# Patient Record
Sex: FEMALE | Race: WHITE | HISPANIC OR LATINO | Employment: STUDENT | URBAN - METROPOLITAN AREA
[De-identification: names, ages, dates, MRNs, and addresses within clinical notes are randomized per-mention and may not be internally consistent; named-entity substitution may affect disease eponyms.]

---

## 2017-03-17 ENCOUNTER — OFFICE VISIT (OUTPATIENT)
Dept: PEDIATRICS | Facility: CLINIC | Age: 22
End: 2017-03-17
Payer: COMMERCIAL

## 2017-03-17 VITALS
DIASTOLIC BLOOD PRESSURE: 66 MMHG | BODY MASS INDEX: 16.7 KG/M2 | SYSTOLIC BLOOD PRESSURE: 106 MMHG | WEIGHT: 106.38 LBS | HEIGHT: 67 IN | HEART RATE: 98 BPM | OXYGEN SATURATION: 97 %

## 2017-03-17 DIAGNOSIS — B30.9 ACUTE VIRAL CONJUNCTIVITIS OF BOTH EYES: ICD-10-CM

## 2017-03-17 DIAGNOSIS — J06.9 UPPER RESPIRATORY TRACT INFECTION, UNSPECIFIED TYPE: Primary | ICD-10-CM

## 2017-03-17 PROCEDURE — 99213 OFFICE O/P EST LOW 20 MIN: CPT | Mod: S$GLB,,, | Performed by: PEDIATRICS

## 2017-03-17 PROCEDURE — 1160F RVW MEDS BY RX/DR IN RCRD: CPT | Mod: S$GLB,,, | Performed by: PEDIATRICS

## 2017-03-17 RX ORDER — LEVOTHYROXINE SODIUM 50 UG/1
TABLET ORAL
Refills: 1 | COMMUNITY
Start: 2017-02-27

## 2017-03-17 RX ORDER — POLYMYXIN B SULFATE AND TRIMETHOPRIM 1; 10000 MG/ML; [USP'U]/ML
1 SOLUTION OPHTHALMIC EVERY 6 HOURS
Qty: 10 ML | Refills: 1 | Status: SHIPPED | OUTPATIENT
Start: 2017-03-17 | End: 2017-11-14

## 2017-03-17 NOTE — PATIENT INSTRUCTIONS
Viral Upper Respiratory Illness (Adult)  You have a viral upper respiratory illness (URI), which is another term for the common cold. This illness is contagious during the first few days. It is spread through the air by coughing and sneezing. It may also be spread by direct contact (touching the sick person and then touching your own eyes, nose, or mouth). Frequent handwashing will decrease risk of spread. Most viral illnesses go away within 7 to 10 days with rest and simple home remedies. Sometimes the illness may last for several weeks. Antibiotics will not kill a virus, and they are generally not prescribed for this condition.    Home care  · If symptoms are severe, rest at home for the first 2 to 3 days. When you resume activity, don't let yourself get too tired.  · Avoid being exposed to cigarette smoke (yours or others).  · You may use acetaminophen or ibuprofen to control pain and fever, unless another medicine was prescribed. (Note: If you have chronic liver or kidney disease, have ever had a stomach ulcer or gastrointestinal bleeding, or are taking blood-thinning medicines, talk with your healthcare provider before using these medicines.) Aspirin should never be given to anyone under 18 years of age who is ill with a viral infection or fever. It may cause severe liver or brain damage.  · Your appetite may be poor, so a light diet is fine. Avoid dehydration by drinking 6 to 8 glasses of fluids per day (water, soft drinks, juices, tea, or soup). Extra fluids will help loosen secretions in the nose and lungs.  · Over-the-counter cold medicines will not shorten the length of time youre sick, but they may be helpful for the following symptoms: cough, sore throat, and nasal and sinus congestion. (Note: Do not use decongestants if you have high blood pressure.)  Follow-up care  Follow up with your healthcare provider, or as advised.  When to seek medical advice  Call your healthcare provider right away if any  of these occur:  · Cough with lots of colored sputum (mucus)  · Severe headache; face, neck, or ear pain  · Difficulty swallowing due to throat pain  · Fever of 100.4°F (38°C)  Call 911, or get immediate medical care  Call emergency services right away if any of these occur:  · Chest pain, shortness of breath, wheezing, or difficulty breathing  · Coughing up blood  · Inability to swallow due to throat pain  Date Last Reviewed: 9/13/2015 © 2000-2016 [employer]. [address] [place] [address]. All rights reserved. This information is not intended as a substitute for professional medical care. Always follow your healthcare professional's instructions.        Conjunctivitis, Viral    Viral conjunctivitis (sometimes called pink eye) is a common infection of the eye. It is very contagious. Touching the infected eye, then touching another person passes this infection. It can also be spread from one eye to the other in this same way. The most common symptoms include redness, discharge from the eye, swollen eyelids, and a gritty or scratchy feeling in the eye.  This condition will take about 7 to 10 days to go away. Artificial tears (available without a prescription) are often recommended to moisten and clean the eyes. Antibiotic eye drops often are not recommended because they will not kill the virus. But sometimes they may be prescribed by eye doctors. This is to prevent a second, bacterial infection.  Home care  · Apply a towel soaked in cool water to the affected eye 3 to 4 times a day (just before applying medicine to the eye).  · It is common to have mucus drainage during the night, causing the eyelids to become crusted by morning. Use a warm, wet cloth to wipe this away.  · Launder cloths used to clean the eye after one use. Do not reuse them.  · If antibiotic medicines are prescribed, take them exactly as directed. Do not stop taking them until you are told to.  · You may use acetaminophen  or ibuprofen to control pain, unless another medicine was prescribed. (Note:If you have chronic liver or kidney disease, or if you have ever had a stomach ulcer or gastrointestinal bleeding, talk with your healthcare provider before using these medicines.) Aspirin should never be used in anyone under 18 years of age who is ill with a fever. It may cause severe liver damage.  · Wash your hands before and after touching the affected eye. This helps to prevent spreading the infection to your other eye and to others.  · The infected person should avoid sharing towels, washcloths, and bedding with others. This is to prevent spreading the infection.  · This illness is contagious during the first week. Children with this illness should be kept out of day care and school until the redness clears.  Follow-up care  Follow up with your healthcare provider, or as advised.  When to seek medical advice  Call your healthcare provider right away if any of these occur:  · Worsening vision  · Increasing pain in the eye  · Increasing swelling or redness of the eyelid  · Redness spreading to the face around the eye  · Large amount of green or yellow drainage from the eye  · Severe itching in or around the eye  · Fever of 100.4°F (38°C) or higher  Date Last Reviewed: 6/15/2015  © 5502-0156 [employer]. [address] [place] [address], [place] [address]. All rights reserved. This information is not intended as a substitute for professional medical care. Always follow your healthcare professional's instructions.

## 2017-03-17 NOTE — MR AVS SNAPSHOT
[facility]  [address] [facility] [address] [facility]  [place] [address]  Phone: [phone]  Fax: [phone]                  Azucena Bowles   3/17/2017 10:15 AM   Office Visit    Description:  Female : 1995   Provider:  Lisa Sierra MD   Department:  [facility]           Reason for Visit     Nasal Congestion           Diagnoses this Visit        Comments    Upper respiratory tract infection, unspecified type    -  Primary     Acute viral conjunctivitis of both eyes                To Do List           Goals (5 Years of Data)     None       These Medications        Disp Refills Start End    polymyxin B sulf-trimethoprim (POLYTRIM) 10,000 unit- 1 mg/mL Drop 10 mL 1 3/17/2017     Place 1 drop into both eyes every 6 (six) hours. - Both Eyes    Pharmacy: [facility] [id number]  JALEN HALL [address] #: [phone]         [facility] On Call     [facility] On Call Nurse [facility] Line -  Assistance  Registered nurses in the [facility] provide clinical advisement, health education, appointment booking, and other advisory services.  Call for this free service at [phone].             Medications           Message regarding Medications     Verify the changes and/or additions to your medication regime listed below are the same as discussed with your clinician today.  If any of these changes or additions are incorrect, please notify your healthcare provider.        START taking these NEW medications        Refills    polymyxin B sulf-trimethoprim (POLYTRIM) 10,000 unit- 1 mg/mL Drop 1    Sig: Place 1 drop into both eyes every 6 (six) hours.    Class: Normal    Route: Both Eyes      STOP taking these medications     fexofenadine-pseudoephedrine (ALLEGRA-D 24) 180-240 mg per 24 hr tablet Take 1 tablet by mouth once daily.    PAZEO 0.7 % Drop            Verify that the below list of medications is an accurate representation of the medications you are  "currently taking.  If none reported, the list may be blank. If incorrect, please contact your healthcare provider. Carry this list with you in case of emergency.           Current Medications     cetirizine (ZYRTEC) 5 MG tablet Take 5 mg by mouth once daily.    fluticasone (FLONASE) 50 mcg/actuation nasal spray 1 spray by Each Nare route once daily.    levothyroxine (SYNTHROID) 50 MCG tablet TK 1 T PO MONDAY THRU FRIDAY. SKIP SATURDAYS AND SUNDAYS    polymyxin B sulf-trimethoprim (POLYTRIM) 10,000 unit- 1 mg/mL Drop Place 1 drop into both eyes every 6 (six) hours.           Clinical Reference Information           Your Vitals Were     BP Pulse Height Weight Last Period SpO2    106/66 (BP Location: Left arm, Patient Position: Sitting, BP Method: Automatic) 98 5' 6.5" (1.689 m) 48.2 kg (106 lb 6 oz) 03/14/2017 (Exact Date) 97%    BMI                16.91 kg/m2          Blood Pressure          Most Recent Value    BP  106/66      Allergies as of 3/17/2017     No Known Allergies      Immunizations Administered on Date of Encounter - 3/17/2017     None      Instructions      Viral Upper Respiratory Illness (Adult)  You have a viral upper respiratory illness (URI), which is another term for the common cold. This illness is contagious during the first few days. It is spread through the air by coughing and sneezing. It may also be spread by direct contact (touching the sick person and then touching your own eyes, nose, or mouth). Frequent handwashing will decrease risk of spread. Most viral illnesses go away within 7 to 10 days with rest and simple home remedies. Sometimes the illness may last for several weeks. Antibiotics will not kill a virus, and they are generally not prescribed for this condition.    Home care  · If symptoms are severe, rest at home for the first 2 to 3 days. When you resume activity, don't let yourself get too tired.  · Avoid being exposed to cigarette smoke (yours or others).  · You may use " acetaminophen or ibuprofen to control pain and fever, unless another medicine was prescribed. (Note: If you have chronic liver or kidney disease, have ever had a stomach ulcer or gastrointestinal bleeding, or are taking blood-thinning medicines, talk with your healthcare provider before using these medicines.) Aspirin should never be given to anyone under 18 years of age who is ill with a viral infection or fever. It may cause severe liver or brain damage.  · Your appetite may be poor, so a light diet is fine. Avoid dehydration by drinking 6 to 8 glasses of fluids per day (water, soft drinks, juices, tea, or soup). Extra fluids will help loosen secretions in the nose and lungs.  · Over-the-counter cold medicines will not shorten the length of time youre sick, but they may be helpful for the following symptoms: cough, sore throat, and nasal and sinus congestion. (Note: Do not use decongestants if you have high blood pressure.)  Follow-up care  Follow up with your healthcare provider, or as advised.  When to seek medical advice  Call your healthcare provider right away if any of these occur:  · Cough with lots of colored sputum (mucus)  · Severe headache; face, neck, or ear pain  · Difficulty swallowing due to throat pain  · Fever of 100.4°F (38°C)  Call 911, or get immediate medical care  Call emergency services right away if any of these occur:  · Chest pain, shortness of breath, wheezing, or difficulty breathing  · Coughing up blood  · Inability to swallow due to throat pain  Date Last Reviewed: 9/13/2015  © 0042-4264 [employer]. [address] [place] [address], [place] [address]. All rights reserved. This information is not intended as a substitute for professional medical care. Always follow your healthcare professional's instructions.        Conjunctivitis, Viral    Viral conjunctivitis (sometimes called pink eye) is a common infection of the eye. It is very contagious. Touching the infected eye, then  touching another person passes this infection. It can also be spread from one eye to the other in this same way. The most common symptoms include redness, discharge from the eye, swollen eyelids, and a gritty or scratchy feeling in the eye.  This condition will take about 7 to 10 days to go away. Artificial tears (available without a prescription) are often recommended to moisten and clean the eyes. Antibiotic eye drops often are not recommended because they will not kill the virus. But sometimes they may be prescribed by eye doctors. This is to prevent a second, bacterial infection.  Home care  · Apply a towel soaked in cool water to the affected eye 3 to 4 times a day (just before applying medicine to the eye).  · It is common to have mucus drainage during the night, causing the eyelids to become crusted by morning. Use a warm, wet cloth to wipe this away.  · Launder cloths used to clean the eye after one use. Do not reuse them.  · If antibiotic medicines are prescribed, take them exactly as directed. Do not stop taking them until you are told to.  · You may use acetaminophen or ibuprofen to control pain, unless another medicine was prescribed. (Note:If you have chronic liver or kidney disease, or if you have ever had a stomach ulcer or gastrointestinal bleeding, talk with your healthcare provider before using these medicines.) Aspirin should never be used in anyone under 18 years of age who is ill with a fever. It may cause severe liver damage.  · Wash your hands before and after touching the affected eye. This helps to prevent spreading the infection to your other eye and to others.  · The infected person should avoid sharing towels, washcloths, and bedding with others. This is to prevent spreading the infection.  · This illness is contagious during the first week. Children with this illness should be kept out of day care and school until the redness clears.  Follow-up care  Follow up with your healthcare  provider, or as advised.  When to seek medical advice  Call your healthcare provider right away if any of these occur:  · Worsening vision  · Increasing pain in the eye  · Increasing swelling or redness of the eyelid  · Redness spreading to the face around the eye  · Large amount of green or yellow drainage from the eye  · Severe itching in or around the eye  · Fever of 100.4°F (38°C) or higher  Date Last Reviewed: 6/15/2015  © 6341-3119 [email or web address]. [address] [place] [address]. All rights reserved. This information is not intended as a substitute for professional medical care. Always follow your healthcare professional's instructions.             Language Assistance Services     ATTENTION: Language assistance services are available, free of charge. Please call [phone].      ATENCIÓN: Si francisco chávez, tiene a vallecillo disposición servicios gratuitos de asistencia lingüística. Llame al [phone].     [facility] Ý: N?u b?n nói Ti?ng Vi?t, có các d?ch v? h? tr? ngôn ng? mi?n phí dành cho b?n. G?i s? [phone].         [facility] complies with applicable Federal civil rights laws and does not discriminate on the basis of race, color, national origin, age, disability, or sex.

## 2017-03-17 NOTE — PROGRESS NOTES
"  Subjective:     History was provided by the patient and mother.  Azucena Bowles is a 22 y.o. female here for evaluation of congestion, post nasal drip, coryza, eyes watering and sinus pressure, non productive cough. Symptoms began 3 days ago. Associated symptoms include:congestion, cough and headache. Patient denies: bilateral ear pain, throat pain, nausea, vomiting, diarrha. Patient has a history of rhinitis, hypothyroidism. Current treatments have included acetaminophen, with some improvement.   Patient has had good liquid intake, with adequate urine output.    Sick contacts? Yes  Other recent illnesses? No    Past Medical History:  I have reviewed patient's past medical history and it is pertinent for:  Hypothyroidism (on synthroid)    Review of Systems   Constitutional: Negative for chills and fever.   HENT: Positive for congestion and sore throat. Negative for ear discharge and ear pain.    Eyes: Positive for discharge and redness. Negative for pain.   Respiratory: Positive for cough. Negative for sputum production, shortness of breath and wheezing.    Gastrointestinal: Negative for abdominal pain, constipation, diarrhea, nausea and vomiting.   Genitourinary: Negative for dysuria.   Musculoskeletal: Negative for myalgias.   Neurological: Positive for headaches.      Objective:    /66 (BP Location: Left arm, Patient Position: Sitting, BP Method: Automatic)  Pulse 98  Ht 5' 6.5" (1.689 m)  Wt 48.2 kg (106 lb 6 oz)  LMP 03/14/2017 (Exact Date)  SpO2 97%  BMI 16.91 kg/m2  Physical Exam   Constitutional: She appears well-developed and well-nourished. No distress.   HENT:   Head: Normocephalic.   Right Ear: Tympanic membrane and external ear normal.   Left Ear: Tympanic membrane and external ear normal.   Nose: Mucosal edema and rhinorrhea present. No nasal deformity. Right sinus exhibits no maxillary sinus tenderness and no frontal sinus tenderness. Left sinus exhibits no maxillary sinus tenderness and no " frontal sinus tenderness.   Mouth/Throat: Oropharynx is clear and moist. No oropharyngeal exudate.   Eyes: Conjunctivae are normal.   Neck: Neck supple.   Cardiovascular: Normal rate, regular rhythm and normal heart sounds.  Exam reveals no gallop and no friction rub.    No murmur heard.  Pulmonary/Chest: Effort normal and breath sounds normal. No respiratory distress. She has no wheezes. She has no rales.   Neurological: She is alert.   Skin: Skin is warm.   Nursing note and vitals reviewed.    Assessment:     Upper respiratory tract infection, unspecified type    Acute viral conjunctivitis of both eyes  -     polymyxin B sulf-trimethoprim (POLYTRIM) 10,000 unit- 1 mg/mL Drop; Place 1 drop into both eyes every 6 (six) hours.  Dispense: 10 mL; Refill: 1      Plan:   1.  Supportive care including nasal saline and/or suctioning, encouraging PO fluid intake with pedialyte, and use of anti-pyretics discussed with family.  Also discussed reasons to return to clinic or ER including high fevers, decreased alertness, signs of respiratory distress, or inability to tolerate PO fluids.

## 2017-11-14 ENCOUNTER — OFFICE VISIT (OUTPATIENT)
Dept: PEDIATRICS | Facility: CLINIC | Age: 22
End: 2017-11-14
Payer: COMMERCIAL

## 2017-11-14 VITALS
DIASTOLIC BLOOD PRESSURE: 74 MMHG | SYSTOLIC BLOOD PRESSURE: 110 MMHG | OXYGEN SATURATION: 100 % | HEIGHT: 66 IN | WEIGHT: 105.25 LBS | HEART RATE: 65 BPM | TEMPERATURE: 98 F | BODY MASS INDEX: 16.91 KG/M2

## 2017-11-14 DIAGNOSIS — I51.9 HEART PROBLEM: Primary | ICD-10-CM

## 2017-11-14 PROCEDURE — 99213 OFFICE O/P EST LOW 20 MIN: CPT | Mod: S$GLB,,, | Performed by: PEDIATRICS

## 2017-11-14 RX ORDER — ERGOCALCIFEROL 1.25 MG/1
CAPSULE ORAL
COMMUNITY
Start: 2017-11-08 | End: 2018-08-21

## 2017-11-14 NOTE — PROGRESS NOTES
Subjective:      Azucena Bowles is a 22 y.o. female here with patient and mother. Patient brought in for heart issues, flutter, fast rate x 2-3 yr (brought by mom - Anabella)      History of Present Illness:  HPI  Pt here for abnormal heart rate with exercise  Notes about once every 2 months  No family hx of heart problems  Has dx of hashimoto's but thyroid levels normal and on synthroid. Dose has been stable  Endocrine states heart issues should not be related to hashimoto's or thyroid  Has been limiting  caffeine  Only takes vitamin d. No other supplements  No light headedness or numbness or tingling when this occurs  Review of Systems  Review of systems otherwise normal except mentioned as above  See problem list    Objective:     Physical Exam  nad  Tm's clear bilaterally  Pharynx clear  heart rrr,  Pulse 7-80 on exam and normal rhythm  No murmur heard  No gallop heard  No rub noted  Lungs cta bilaterally   no increased work of breathing noted  No wheezes heard  No rales heard  No ronchi heard    Abdomen soft,   Bowel sounds present  Non tender  No masses palpated  No rashes noted  Mmm, cap refill brisk, less than 2 seconds  No obvious global/focal motor/sensory deficits  Cranial nerves 2-12 grossly intact  rom of all extremities normal for age      Assessment:        1. Heart problem         Plan:       Azucena was seen today for heart issues, flutter, fast rate x 2-3 yr.    Diagnoses and all orders for this visit:    Heart problem  -     Ambulatory referral to Pediatric Cardiology  -     Nursing communication      Await above referral  Hear trate  temp and pulse ox good in office today  rtc 24-72 prn no  Improvement 24-72 hours or sooner prn problems.  Parent/guardian voiced understanding.

## 2018-01-05 ENCOUNTER — OFFICE VISIT (OUTPATIENT)
Dept: PEDIATRICS | Facility: CLINIC | Age: 23
End: 2018-01-05
Payer: COMMERCIAL

## 2018-01-05 ENCOUNTER — TELEPHONE (OUTPATIENT)
Dept: PEDIATRICS | Facility: CLINIC | Age: 23
End: 2018-01-05

## 2018-01-05 VITALS
DIASTOLIC BLOOD PRESSURE: 67 MMHG | HEIGHT: 66 IN | SYSTOLIC BLOOD PRESSURE: 118 MMHG | TEMPERATURE: 99 F | BODY MASS INDEX: 16.24 KG/M2 | WEIGHT: 101.06 LBS

## 2018-01-05 DIAGNOSIS — J06.9 UPPER RESPIRATORY TRACT INFECTION, UNSPECIFIED TYPE: ICD-10-CM

## 2018-01-05 DIAGNOSIS — R05.9 COUGH: Primary | ICD-10-CM

## 2018-01-05 DIAGNOSIS — R09.81 NASAL CONGESTION: ICD-10-CM

## 2018-01-05 LAB
FLUAV AG SPEC QL IA: NEGATIVE
FLUBV AG SPEC QL IA: NEGATIVE
SPECIMEN SOURCE: NORMAL

## 2018-01-05 PROCEDURE — 87400 INFLUENZA A/B EACH AG IA: CPT | Mod: 59,PO

## 2018-01-05 PROCEDURE — 99213 OFFICE O/P EST LOW 20 MIN: CPT | Mod: S$GLB,,, | Performed by: PEDIATRICS

## 2018-01-05 RX ORDER — BENZONATATE 200 MG/1
200 CAPSULE ORAL 3 TIMES DAILY PRN
Qty: 30 CAPSULE | Refills: 1 | Status: SHIPPED | OUTPATIENT
Start: 2018-01-05 | End: 2018-01-15

## 2018-01-05 NOTE — TELEPHONE ENCOUNTER
----- Message from Lisa Sierra MD sent at 1/5/2018  3:16 PM CST -----  Please let family know that flu test negative, so likely a common cold virus. They may call if questions/concerns. Thank you!  -MM

## 2018-01-05 NOTE — PROGRESS NOTES
"  Subjective:     History was provided by the patient and mother.  Azucena Bowles is a 22 y.o. female here for evaluation of sore throat, congestion, non productive cough and low grade fevers. Symptoms began 3 days ago. Associated symptoms include:headache. Patient denies: vomiting, diarrhea, ear pain. Patient has a history of general health . Current treatments have included acetaminophen, with little improvement.   Patient has had good liquid intake, with adequate urine output.    Sick contacts? Yes  Other recent illnesses? No    Past Medical History:  I have reviewed patient's past medical history and it is pertinent for:    Review of Systems   Constitutional: Positive for fever and malaise/fatigue. Negative for chills.   HENT: Positive for congestion.    Respiratory: Positive for cough. Negative for wheezing.    Gastrointestinal: Negative for constipation, diarrhea, nausea and vomiting.   Genitourinary: Negative for dysuria.   Musculoskeletal: Positive for myalgias.   Skin: Negative for rash.        Objective:    /67 (BP Location: Left arm, Patient Position: Sitting, BP Method: Medium (Automatic))   Temp 98.5 °F (36.9 °C) (Oral)   Ht 5' 6" (1.676 m)   Wt 45.8 kg (101 lb 1.3 oz)   LMP 01/01/2018 (Exact Date)   BMI 16.31 kg/m²   Physical Exam   Constitutional: She appears well-developed and well-nourished. No distress.   HENT:   Head: Normocephalic.   Right Ear: External ear normal.   Left Ear: External ear normal.   Nose: Nose normal.   Mouth/Throat: Oropharynx is clear and moist. No oropharyngeal exudate.   TMs clear, clear PND, mild cobblestoning   Eyes: Conjunctivae are normal.   Neck: Neck supple.   Cardiovascular: Normal rate, regular rhythm and normal heart sounds.  Exam reveals no gallop and no friction rub.    No murmur heard.  Pulmonary/Chest: Effort normal and breath sounds normal. No respiratory distress. She has no wheezes. She has no rales.   Neurological: She is alert.   Skin: Skin is warm. "   Nursing note and vitals reviewed.         Assessment:   Cough  -     benzonatate (TESSALON) 200 MG capsule; Take 1 capsule (200 mg total) by mouth 3 (three) times daily as needed for Cough.  Dispense: 30 capsule; Refill: 1  -     Influenza antigen Nasal Swab    Upper respiratory tract infection, unspecified type    Nasal congestion      Plan:   1.  Supportive care including nasal saline and/or suctioning, encouraging PO fluid intake with pedialyte, and use of anti-pyretics discussed with family.  Also discussed reasons to return to clinic or ER including high fevers, decreased alertness, signs of respiratory distress, or inability to tolerate PO fluids.

## 2018-08-21 ENCOUNTER — TELEPHONE (OUTPATIENT)
Dept: PEDIATRICS | Facility: CLINIC | Age: 23
End: 2018-08-21

## 2018-08-21 ENCOUNTER — OFFICE VISIT (OUTPATIENT)
Dept: PEDIATRICS | Facility: CLINIC | Age: 23
End: 2018-08-21
Payer: COMMERCIAL

## 2018-08-21 VITALS
WEIGHT: 110 LBS | HEIGHT: 67 IN | OXYGEN SATURATION: 99 % | DIASTOLIC BLOOD PRESSURE: 62 MMHG | BODY MASS INDEX: 17.27 KG/M2 | TEMPERATURE: 99 F | HEART RATE: 92 BPM | SYSTOLIC BLOOD PRESSURE: 112 MMHG

## 2018-08-21 DIAGNOSIS — J03.90 TONSILLITIS: Primary | ICD-10-CM

## 2018-08-21 LAB — DEPRECATED S PYO AG THROAT QL EIA: NEGATIVE

## 2018-08-21 PROCEDURE — 3008F BODY MASS INDEX DOCD: CPT | Mod: CPTII,S$GLB,, | Performed by: PEDIATRICS

## 2018-08-21 PROCEDURE — 99213 OFFICE O/P EST LOW 20 MIN: CPT | Mod: S$GLB,,, | Performed by: PEDIATRICS

## 2018-08-21 PROCEDURE — 87081 CULTURE SCREEN ONLY: CPT

## 2018-08-21 PROCEDURE — 87880 STREP A ASSAY W/OPTIC: CPT | Mod: PO

## 2018-08-21 RX ORDER — ERGOCALCIFEROL 1.25 MG/1
50000 CAPSULE ORAL
COMMUNITY

## 2018-08-21 NOTE — PROGRESS NOTES
Subjective:      Azucena Bowles is a 23 y.o. female here with patient. Patient brought in for Nasal Congestion x 3 dys (self) and Sore Throat      History of Present Illness:  Azucena is a 24 yo female established patient presenting for evaluation of sore throat and nasal congestion x 3 days. Denies fever and cough.   Appetite is normal.  Patient is taking ibuprofen for pain and chloraseptic spray for the throat.         Review of Systems   Constitutional: Negative for activity change, appetite change and fever.   HENT: Positive for congestion, postnasal drip, rhinorrhea and sore throat.    Respiratory: Negative for cough.        Objective:     Physical Exam   Constitutional: She appears well-developed and well-nourished. No distress.   HENT:   Right Ear: External ear normal.   Left Ear: External ear normal.   Mouth/Throat: Oropharyngeal exudate present.   Nasal discharge   Eyes: Conjunctivae are normal. Right eye exhibits no discharge. Left eye exhibits no discharge.   Neck: Normal range of motion.   Cardiovascular: Normal rate and regular rhythm. Exam reveals no gallop and no friction rub.   No murmur heard.  Pulmonary/Chest: Effort normal and breath sounds normal.   Lymphadenopathy:     She has cervical adenopathy.   Neurological: She is alert. She exhibits normal muscle tone.   Skin: Skin is warm and dry.       Assessment:        1. Tonsillitis         Plan:   Azucena was seen today for nasal congestion x 3 dys and sore throat.    Diagnoses and all orders for this visit:    Tonsillitis  -     Throat Screen, Rapid    Other orders  -     Strep A culture, throat      Rapid strep was negative, f/u strep culture.  Continues supportive care for this viral infection, f/u prn.       Shanda Redding MD

## 2018-08-21 NOTE — TELEPHONE ENCOUNTER
----- Message from Shanda Redding MD sent at 8/21/2018  2:44 PM CDT -----  Please notify the patient ([phone]) that her rapid strep test was negative.  Continue supportive care.  We'll call if anything growth on the culture.

## 2018-08-23 LAB — BACTERIA THROAT CULT: NORMAL

## 2019-02-05 ENCOUNTER — OFFICE VISIT (OUTPATIENT)
Dept: PEDIATRICS | Facility: CLINIC | Age: 24
End: 2019-02-05
Payer: COMMERCIAL

## 2019-02-05 VITALS
TEMPERATURE: 98 F | BODY MASS INDEX: 16.87 KG/M2 | SYSTOLIC BLOOD PRESSURE: 108 MMHG | WEIGHT: 104.94 LBS | DIASTOLIC BLOOD PRESSURE: 70 MMHG | HEIGHT: 66 IN

## 2019-02-05 DIAGNOSIS — Z00.00 WELL ADULT EXAM: Primary | ICD-10-CM

## 2019-02-05 DIAGNOSIS — Z23 IMMUNIZATION DUE: ICD-10-CM

## 2019-02-05 PROCEDURE — 90714 TD VACC NO PRESV 7 YRS+ IM: CPT | Mod: S$GLB,,, | Performed by: PEDIATRICS

## 2019-02-05 PROCEDURE — 90686 IIV4 VACC NO PRSV 0.5 ML IM: CPT | Mod: S$GLB,,, | Performed by: PEDIATRICS

## 2019-02-05 PROCEDURE — 90686 FLU VACCINE (QUAD) GREATER THAN OR EQUAL TO 3YO PRESERVATIVE FREE IM: ICD-10-PCS | Mod: S$GLB,,, | Performed by: PEDIATRICS

## 2019-02-05 PROCEDURE — 90472 TD VACCINE GREATER THAN OR EQUAL TO 7YO PRESERVATIVE FREE IM: ICD-10-PCS | Mod: S$GLB,,, | Performed by: PEDIATRICS

## 2019-02-05 PROCEDURE — 99395 PR PREVENTIVE VISIT,EST,18-39: ICD-10-PCS | Mod: 25,S$GLB,, | Performed by: PEDIATRICS

## 2019-02-05 PROCEDURE — 90471 FLU VACCINE (QUAD) GREATER THAN OR EQUAL TO 3YO PRESERVATIVE FREE IM: ICD-10-PCS | Mod: S$GLB,,, | Performed by: PEDIATRICS

## 2019-02-05 PROCEDURE — 86580 TB INTRADERMAL TEST: CPT | Mod: S$GLB,,, | Performed by: PEDIATRICS

## 2019-02-05 PROCEDURE — 90472 IMMUNIZATION ADMIN EACH ADD: CPT | Mod: S$GLB,,, | Performed by: PEDIATRICS

## 2019-02-05 PROCEDURE — 90471 IMMUNIZATION ADMIN: CPT | Mod: S$GLB,,, | Performed by: PEDIATRICS

## 2019-02-05 PROCEDURE — 86580 POCT TB SKIN TEST: ICD-10-PCS | Mod: S$GLB,,, | Performed by: PEDIATRICS

## 2019-02-05 PROCEDURE — 90714 TD VACCINE GREATER THAN OR EQUAL TO 7YO PRESERVATIVE FREE IM: ICD-10-PCS | Mod: S$GLB,,, | Performed by: PEDIATRICS

## 2019-02-05 PROCEDURE — 99395 PREV VISIT EST AGE 18-39: CPT | Mod: 25,S$GLB,, | Performed by: PEDIATRICS

## 2019-02-05 NOTE — PROGRESS NOTES
Subjective:      Azucena Bowles is a 23 y.o. female here with patient and mother. Patient brought in for Well Child (cherelle and [employer]m-[employer].  in college.  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    Eating well.  No concerns regarding hearing  No concerns regarding  vision    Sleeping well.  No problems with urination   no problems with  bowel movements  No depression concerns  No mention of tobacco use  No mental health concerns    Review of Systems   Constitutional: Negative.  Negative for activity change, appetite change and fever.   HENT: Negative.  Negative for congestion and sore throat.    Eyes: Negative.  Negative for discharge and redness.   Respiratory: Negative.  Negative for cough and wheezing.    Cardiovascular: Negative.  Negative for chest pain and palpitations.   Gastrointestinal: Negative.  Negative for constipation, diarrhea and vomiting.   Endocrine:        See above   Genitourinary: Negative.  Negative for difficulty urinating and hematuria.   Musculoskeletal: Negative.    Skin: Negative.  Negative for rash and wound.   Allergic/Immunologic: Negative.    Neurological: Negative.  Negative for syncope and headaches.   Hematological: Negative.    Psychiatric/Behavioral: Negative.  Negative for behavioral problems and sleep disturbance.   All other systems reviewed and are negative.      Objective:     Physical Exam   Constitutional: She appears well-developed and well-nourished.   HENT:   Head: Normocephalic and atraumatic.   Right Ear: External ear normal.   Left Ear: External ear normal.   Eyes: EOM are normal. Pupils are equal, round, and reactive to light.   Neck: Normal range of motion.   Cardiovascular: Normal rate, regular rhythm and normal heart sounds.   Pulmonary/Chest: Effort normal and breath  sounds normal.   Abdominal: Soft.   Musculoskeletal: Normal range of motion.   No scoliosis   Skin: Skin is warm and dry.   Psychiatric: Her behavior is normal.   Vitals reviewed.      Assessment:        1. Well adult exam    2. Immunization due         Plan:      .Azucena was seen today for well child.    Diagnoses and all orders for this visit:    Well adult exam  -     C. trachomatis/N. gonorrhoeae by AMP DNA  -     POCT TB Skin Test Read    Immunization due  -     Influenza - Quadrivalent (3 years & older) (PF)  -     Cancel: (In Office Administered) Td Vaccine    Other orders  -     (In Office Administered) Td Vaccine - Preservative Free      Discussed age appropriate anticipatory guidance issues  Discussed immunization schedule  rtc prn  Will do second ppd if needed  Will complete further forms if needed  Keep up with endocrine

## 2019-02-06 ENCOUNTER — TELEPHONE (OUTPATIENT)
Dept: PEDIATRICS | Facility: CLINIC | Age: 24
End: 2019-02-06

## 2019-02-06 NOTE — TELEPHONE ENCOUNTER
----- Message from Jacqueline Cody sent at 2/6/2019  9:21 AM CST -----  Regarding: Lab Client Services  Contact: [phone]  Hi my name is Jacqueline I work in the Lab Client Services. We had a problem with some lab work on this patient. If someone from your office could call us at [phone] or ext [phone] that would be great. Anyone in my department can help. Thank you

## 2019-02-06 NOTE — TELEPHONE ENCOUNTER
Spoke to lab main campus ct/gc was never ordered or collected they called to say insufficient qty no such lab done

## 2019-02-07 LAB
TB INDURATION - 48 HR READ: NORMAL MM
TB INDURATION - 72 HR READ: NORMAL MM
TB SKIN TEST - 48 HR READ: NEGATIVE
TB SKIN TEST - 72 HR READ: NORMAL

## 2019-02-08 ENCOUNTER — TELEPHONE (OUTPATIENT)
Dept: PEDIATRICS | Facility: CLINIC | Age: 24
End: 2019-02-08

## 2019-02-08 NOTE — TELEPHONE ENCOUNTER
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         -diana

## 2019-02-11 NOTE — TELEPHONE ENCOUNTER
Asking for handwritten note from doctor    Ok by me. Will write note but not sure what  Needs to be in note    Will send tot riage to assist

## 2019-02-15 ENCOUNTER — TELEPHONE (OUTPATIENT)
Dept: PEDIATRICS | Facility: CLINIC | Age: 24
End: 2019-02-15

## 2019-02-15 NOTE — TELEPHONE ENCOUNTER
----- Message from Nubia June sent at 2/15/2019 11:03 AM CST -----  Contact: Anabella roach [phone]  Mom is requesting a call back from the nurse because she has questions about her child's shot record. Please call mom

## 2019-02-19 ENCOUNTER — CLINICAL SUPPORT (OUTPATIENT)
Dept: PEDIATRICS | Facility: CLINIC | Age: 24
End: 2019-02-19
Payer: COMMERCIAL

## 2019-02-19 DIAGNOSIS — Z11.1 SCREENING-PULMONARY TB: Primary | ICD-10-CM

## 2019-02-19 PROCEDURE — 86580 TB INTRADERMAL TEST: CPT | Mod: S$GLB,,, | Performed by: PEDIATRICS

## 2019-02-19 PROCEDURE — 86580 POCT TB SKIN TEST: ICD-10-PCS | Mod: S$GLB,,, | Performed by: PEDIATRICS

## 2019-02-21 LAB
TB INDURATION - 48 HR READ: 0 MM
TB INDURATION - 72 HR READ: NORMAL MM
TB SKIN TEST - 48 HR READ: NEGATIVE
TB SKIN TEST - 72 HR READ: NORMAL

## 2019-03-04 NOTE — PROGRESS NOTES
Patient seen in clinic to have PPD skin test placed. Instructed patient on when to return to clinic, no adverse reactions noted.

## 2020-03-10 ENCOUNTER — OFFICE VISIT (OUTPATIENT)
Dept: FAMILY MEDICINE | Facility: CLINIC | Age: 25
End: 2020-03-10
Payer: COMMERCIAL

## 2020-03-10 VITALS
BODY MASS INDEX: 17.01 KG/M2 | OXYGEN SATURATION: 100 % | SYSTOLIC BLOOD PRESSURE: 106 MMHG | TEMPERATURE: 99 F | WEIGHT: 105.38 LBS | DIASTOLIC BLOOD PRESSURE: 60 MMHG | HEART RATE: 84 BPM | RESPIRATION RATE: 20 BRPM

## 2020-03-10 DIAGNOSIS — E55.9 VITAMIN D DEFICIENCY: ICD-10-CM

## 2020-03-10 DIAGNOSIS — H00.011 HORDEOLUM EXTERNUM OF RIGHT UPPER EYELID: Primary | ICD-10-CM

## 2020-03-10 DIAGNOSIS — E03.9 HYPOTHYROIDISM, UNSPECIFIED TYPE: ICD-10-CM

## 2020-03-10 PROCEDURE — 99203 PR OFFICE/OUTPT VISIT, NEW, LEVL III, 30-44 MIN: ICD-10-PCS | Mod: S$GLB,,, | Performed by: FAMILY MEDICINE

## 2020-03-10 PROCEDURE — 3008F PR BODY MASS INDEX (BMI) DOCUMENTED: ICD-10-PCS | Mod: CPTII,S$GLB,, | Performed by: FAMILY MEDICINE

## 2020-03-10 PROCEDURE — 99999 PR PBB SHADOW E&M-EST. PATIENT-LVL III: ICD-10-PCS | Mod: PBBFAC,,, | Performed by: FAMILY MEDICINE

## 2020-03-10 PROCEDURE — 99999 PR PBB SHADOW E&M-EST. PATIENT-LVL III: CPT | Mod: PBBFAC,,, | Performed by: FAMILY MEDICINE

## 2020-03-10 PROCEDURE — 3008F BODY MASS INDEX DOCD: CPT | Mod: CPTII,S$GLB,, | Performed by: FAMILY MEDICINE

## 2020-03-10 PROCEDURE — 99203 OFFICE O/P NEW LOW 30 MIN: CPT | Mod: S$GLB,,, | Performed by: FAMILY MEDICINE

## 2020-03-10 RX ORDER — SULFAMETHOXAZOLE AND TRIMETHOPRIM 800; 160 MG/1; MG/1
1 TABLET ORAL 2 TIMES DAILY
Qty: 14 TABLET | Refills: 0 | Status: SHIPPED | OUTPATIENT
Start: 2020-03-10 | End: 2020-03-17

## 2020-03-10 RX ORDER — POLYMYXIN B SULFATE AND TRIMETHOPRIM 1; 10000 MG/ML; [USP'U]/ML
1 SOLUTION OPHTHALMIC EVERY 6 HOURS
Qty: 10 ML | Refills: 0 | Status: SHIPPED | OUTPATIENT
Start: 2020-03-10

## 2020-03-10 NOTE — PROGRESS NOTES
Subjective:       Patient ID: Azucena Bowles is a 25 y.o. female.    Chief Complaint: Belepharitis (possible stye on eye)    HPI   25 year old female presents with swelling of right upper eyelid. She states that it started last night. She applied a warm compress that helped some. She does not use contact lenses. She does use eye makeup. Has not applied any makeup today. States that it is tender to the touch. No visual disturbance. No fevers.   Patient also takes medication for hypothyroidism and Vitamin D deficiency.    Review of Systems   Constitutional: Negative for chills and fever.   HENT: Negative for congestion and rhinorrhea.    Eyes: Positive for pain (right upper eye lid). Negative for photophobia, discharge, redness, itching and visual disturbance.   Respiratory: Negative for shortness of breath and wheezing.    Cardiovascular: Negative for chest pain and palpitations.   Gastrointestinal: Negative for abdominal pain.   Genitourinary: Negative for dysuria and menstrual problem.   Skin: Positive for rash (redness of upper eyelid).   Allergic/Immunologic: Negative for environmental allergies, food allergies and immunocompromised state.   Neurological: Negative for headaches.   Hematological: Negative for adenopathy.       Objective:     /60 (BP Location: Left arm, Patient Position: Sitting, BP Method: Small (Automatic))   Pulse 84   Temp 99.2 °F (37.3 °C) (Oral)   Resp 20   Wt 47.8 kg (105 lb 6.1 oz)   SpO2 100%   BMI 17.01 kg/m²     Physical Exam   Constitutional: She appears well-developed and well-nourished. No distress.   HENT:   Head: Normocephalic and atraumatic.   Right Ear: External ear normal.   Left Ear: External ear normal.   Nose: Nose normal.   Mouth/Throat: Oropharynx is clear and moist.   Eyes: Pupils are equal, round, and reactive to light. Conjunctivae and EOM are normal. Right eye exhibits hordeolum. Right eye exhibits no chemosis, no discharge and no exudate. Left eye exhibits no  chemosis, no discharge, no exudate and no hordeolum. Right conjunctiva is not injected. Left conjunctiva is not injected.       Neck: Normal range of motion. Neck supple.   Cardiovascular: Normal rate, regular rhythm, normal heart sounds and intact distal pulses.   Pulmonary/Chest: Effort normal and breath sounds normal. No stridor. She has no wheezes. She has no rales.       Assessment:       1. Hordeolum externum of right upper eyelid    2. Hypothyroidism, unspecified type    3. Vitamin D deficiency        Plan:       Azucena was seen today for belepharitis.    Diagnoses and all orders for this visit:    Hordeolum externum of right upper eyelid  -     polymyxin B sulf-trimethoprim (POLYTRIM) 10,000 unit- 1 mg/mL Drop; Place 1 drop into the right eye every 6 (six) hours.  -     sulfamethoxazole-trimethoprim 800-160mg (BACTRIM DS) 800-160 mg Tab; Take 1 tablet by mouth 2 (two) times daily. for 7 days  -     Ambulatory referral/consult to Optometry; Future  Advised continued warm compresses for 10-15 minutes, 4 times a day.  Start on topical Polytrim and oral Bactrim.  Advised to stop medication if any rash, and call office.  Follow up with optometry if not resolved in a few days.    Hypothyroidism, unspecified type  On levothyroxine.    Vitamin D deficiency  On Vitamin D weekly.

## 2020-03-19 ENCOUNTER — TELEPHONE (OUTPATIENT)
Dept: FAMILY MEDICINE | Facility: CLINIC | Age: 25
End: 2020-03-19

## 2020-03-19 RX ORDER — NYSTATIN 100000 [USP'U]/ML
5 SUSPENSION ORAL 4 TIMES DAILY
Qty: 200 ML | Refills: 0 | Status: SHIPPED | OUTPATIENT
Start: 2020-03-19 | End: 2020-03-29

## 2020-03-19 NOTE — TELEPHONE ENCOUNTER
----- Message from Tracey Dalton sent at 3/19/2020  9:45 AM CDT -----  Contact: Anabella-Mother  Type: Patient Call Back    Who called:Anabella    What is the request in detail:Anabella called because patient was prescribed an antibiotic and has now gotten thrush. Please call to advise.    Can the clinic reply by [facility]?    Would the patient rather a call back or a response via [facility]? Call    Best call back number:[phone]

## 2020-03-19 NOTE — TELEPHONE ENCOUNTER
----- Message from Paz Hargrove MA sent at 3/19/2020 10:14 AM CDT -----  Contact: Anabella-Mother  Please Advise  ----- Message -----  From: Tracey Dalton  Sent: 3/19/2020   9:45 AM CDT  To: [facility] Staff    Type: Patient Call Back    Who called:Anabella    What is the request in detail:Anabella called because patient was prescribed an antibiotic and has now gotten thrush. Please call to advise.    Can the clinic reply by [facility]?    Would the patient rather a call back or a response via [facility]? Call    Best call back number:[phone]

## 2020-05-27 ENCOUNTER — TELEPHONE (OUTPATIENT)
Dept: FAMILY MEDICINE | Facility: CLINIC | Age: 25
End: 2020-05-27

## 2020-05-27 NOTE — TELEPHONE ENCOUNTER
I spoke to the pt mother and she advises when labs were drawn it immediately started to sting and burn. The burning sensation lasted for two hours. No redness or swelling noted. Pain was initially a 7/10 and pain has decreased since taking Ibuprofen to a 1/10. Please advise

## 2020-05-27 NOTE — TELEPHONE ENCOUNTER
"----- Message from Janethmarzena Macias sent at 5/27/2020 12:41 PM CDT -----  Contact: Anabella"mother" [phone]  Type: Patient Call Back    Who called:Anabella"mother"    What is the request in detail: calling in regards to patient, she went to [facility] on today to get her labs drawn for her thyroid and when she was stuck with the needle it burned and it is still burning and she is wondering what they should do    Can the clinic reply by [facility]? Call back    Would the patient rather a call back or a response via [facility]? Call back    Best call back number: [phone]          "

## 2021-01-08 ENCOUNTER — IMMUNIZATION (OUTPATIENT)
Dept: INTERNAL MEDICINE | Facility: CLINIC | Age: 26
End: 2021-01-08
Payer: COMMERCIAL

## 2021-01-08 DIAGNOSIS — Z23 NEED FOR VACCINATION: ICD-10-CM

## 2021-01-08 PROCEDURE — 0001A COVID-19, MRNA, LNP-S, PF, 30 MCG/0.3 ML DOSE VACCINE: ICD-10-PCS | Mod: CV19,,, | Performed by: INTERNAL MEDICINE

## 2021-01-08 PROCEDURE — 91300 COVID-19, MRNA, LNP-S, PF, 30 MCG/0.3 ML DOSE VACCINE: ICD-10-PCS | Mod: ,,, | Performed by: INTERNAL MEDICINE

## 2021-01-08 PROCEDURE — 91300 COVID-19, MRNA, LNP-S, PF, 30 MCG/0.3 ML DOSE VACCINE: CPT | Mod: ,,, | Performed by: INTERNAL MEDICINE

## 2021-01-08 PROCEDURE — 0001A COVID-19, MRNA, LNP-S, PF, 30 MCG/0.3 ML DOSE VACCINE: CPT | Mod: CV19,,, | Performed by: INTERNAL MEDICINE

## 2021-01-29 ENCOUNTER — IMMUNIZATION (OUTPATIENT)
Dept: INTERNAL MEDICINE | Facility: CLINIC | Age: 26
End: 2021-01-29
Payer: COMMERCIAL

## 2021-01-29 DIAGNOSIS — Z23 NEED FOR VACCINATION: Primary | ICD-10-CM

## 2021-01-29 PROCEDURE — 0002A COVID-19, MRNA, LNP-S, PF, 30 MCG/0.3 ML DOSE VACCINE: CPT | Mod: PBBFAC | Performed by: INTERNAL MEDICINE

## 2021-01-29 PROCEDURE — 91300 COVID-19, MRNA, LNP-S, PF, 30 MCG/0.3 ML DOSE VACCINE: CPT | Mod: PBBFAC | Performed by: INTERNAL MEDICINE

## 2021-07-12 ENCOUNTER — OFFICE VISIT (OUTPATIENT)
Dept: OBSTETRICS AND GYNECOLOGY | Facility: CLINIC | Age: 26
End: 2021-07-12
Payer: COMMERCIAL

## 2021-07-12 VITALS — WEIGHT: 102.94 LBS | DIASTOLIC BLOOD PRESSURE: 72 MMHG | SYSTOLIC BLOOD PRESSURE: 120 MMHG

## 2021-07-12 DIAGNOSIS — N76.0 CONTACT VAGINITIS: ICD-10-CM

## 2021-07-12 DIAGNOSIS — Z01.419 VISIT FOR GYNECOLOGIC EXAMINATION: Primary | ICD-10-CM

## 2021-07-12 PROCEDURE — 99999 PR PBB SHADOW E&M-NEW PATIENT-LVL II: ICD-10-PCS | Mod: PBBFAC,,, | Performed by: OBSTETRICS & GYNECOLOGY

## 2021-07-12 PROCEDURE — 87481 CANDIDA DNA AMP PROBE: CPT | Mod: 59 | Performed by: OBSTETRICS & GYNECOLOGY

## 2021-07-12 PROCEDURE — 99999 PR PBB SHADOW E&M-NEW PATIENT-LVL II: CPT | Mod: PBBFAC,,, | Performed by: OBSTETRICS & GYNECOLOGY

## 2021-07-12 PROCEDURE — 99385 PREV VISIT NEW AGE 18-39: CPT | Mod: S$GLB,,, | Performed by: OBSTETRICS & GYNECOLOGY

## 2021-07-12 PROCEDURE — 99385 PR PREVENTIVE VISIT,NEW,18-39: ICD-10-PCS | Mod: S$GLB,,, | Performed by: OBSTETRICS & GYNECOLOGY

## 2021-07-12 PROCEDURE — 88175 CYTOPATH C/V AUTO FLUID REDO: CPT | Performed by: OBSTETRICS & GYNECOLOGY

## 2021-07-16 LAB
BACTERIAL VAGINOSIS DNA: NEGATIVE
CANDIDA GLABRATA DNA: NEGATIVE
CANDIDA KRUSEI DNA: NEGATIVE
CANDIDA RRNA VAG QL PROBE: NEGATIVE
FINAL PATHOLOGIC DIAGNOSIS: NORMAL
Lab: NORMAL
T VAGINALIS RRNA GENITAL QL PROBE: NEGATIVE

## 2021-07-18 ENCOUNTER — PATIENT MESSAGE (OUTPATIENT)
Dept: OBSTETRICS AND GYNECOLOGY | Facility: CLINIC | Age: 26
End: 2021-07-18

## 2025-05-15 ENCOUNTER — HOSPITAL ENCOUNTER (EMERGENCY)
Facility: HOSPITAL | Age: 30
Discharge: HOME | End: 2025-05-15
Attending: EMERGENCY MEDICINE
Payer: COMMERCIAL

## 2025-05-15 VITALS
TEMPERATURE: 98 F | DIASTOLIC BLOOD PRESSURE: 70 MMHG | WEIGHT: 110 LBS | HEIGHT: 65 IN | BODY MASS INDEX: 18.33 KG/M2 | SYSTOLIC BLOOD PRESSURE: 111 MMHG | HEART RATE: 85 BPM | OXYGEN SATURATION: 100 % | RESPIRATION RATE: 16 BRPM

## 2025-05-15 DIAGNOSIS — S52.125A CLOSED NONDISPLACED FRACTURE OF HEAD OF LEFT RADIUS, INITIAL ENCOUNTER: Primary | ICD-10-CM

## 2025-05-15 DIAGNOSIS — M25.529 ELBOW PAIN: ICD-10-CM
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
## 2025-05-15 DIAGNOSIS — M25.532 LEFT WRIST PAIN: ICD-10-CM

## 2025-05-15 LAB
B-HCG UR QL: NEGATIVE
CTP QC/QA: YES

## 2025-05-15 PROCEDURE — 81025 URINE PREGNANCY TEST: CPT | Performed by: PHYSICIAN ASSISTANT

## 2025-05-15 PROCEDURE — 96372 THER/PROPH/DIAG INJ SC/IM: CPT

## 2025-05-15 PROCEDURE — 99284 EMERGENCY DEPT VISIT MOD MDM: CPT | Mod: 25

## 2025-05-15 PROCEDURE — 25000003 PHARM REV CODE 250

## 2025-05-15 PROCEDURE — 63600175 PHARM REV CODE 636 W HCPCS

## 2025-05-15 RX ORDER — DEXAMETHASONE SODIUM PHOSPHATE 4 MG/ML
8 INJECTION, SOLUTION INTRA-ARTICULAR; INTRALESIONAL; INTRAMUSCULAR; INTRAVENOUS; SOFT TISSUE
Status: COMPLETED | OUTPATIENT
Start: 2025-05-15 | End: 2025-05-15

## 2025-05-15 RX ORDER — DIPHENHYDRAMINE HCL 25 MG
25 CAPSULE ORAL
Status: COMPLETED | OUTPATIENT
Start: 2025-05-15 | End: 2025-05-15

## 2025-05-15 RX ORDER — IBUPROFEN 600 MG/1
600 TABLET, FILM COATED ORAL
Status: COMPLETED | OUTPATIENT
Start: 2025-05-15 | End: 2025-05-15

## 2025-05-15 RX ORDER — FAMOTIDINE 20 MG/1
20 TABLET, FILM COATED ORAL
Status: COMPLETED | OUTPATIENT
Start: 2025-05-15 | End: 2025-05-15

## 2025-05-15 RX ADMIN — IBUPROFEN 600 MG: 600 TABLET, FILM COATED ORAL at 05:05

## 2025-05-15 RX ADMIN — DIPHENHYDRAMINE HYDROCHLORIDE 25 MG: 25 CAPSULE ORAL at 06:05

## 2025-05-15 RX ADMIN — FAMOTIDINE 20 MG: 20 TABLET, FILM COATED ORAL at 06:05

## 2025-05-15 RX ADMIN — DEXAMETHASONE SODIUM PHOSPHATE 8 MG: 4 INJECTION INTRA-ARTICULAR; INTRALESIONAL; INTRAMUSCULAR; INTRAVENOUS; SOFT TISSUE at 06:05

## 2025-05-15 NOTE — ED PROVIDER NOTES
Encounter Date: 5/15/2025    SCRIBE #1 NOTE: I, Patricia Go, am scribing for, and in the presence of,  Felecia Castellon PA-C. I have scribed the following portions of the note - Other sections scribed: HPI,ROS.       History     Chief Complaint   Patient presents with    Fall     Was roller skating and fell backwards, tried to catch herself injuring LEFT elbow. Reports painful to move. Some swelling noted. Used tylenol without relief.      Azucena Bowles is a 30 y.o. female, with no pertinent PMHx, who presents to the ED from fall onset 2 hours ago. Patient reports symptoms of left wrist pain, left elbow pain and swelling. Patient reports that she was roller skating when she tripped on a stick and fell backwards. She reports bracing with her arms(left more than her right). Patient denies hitting her head or LOC. Patient reports that her pain is worse with attempting to turn/twist her left lower arm/wrist. Patient endorses taking tylenol(last taking 1.5 hours ago).No other exacerbating or alleviating factors.     The history is provided by the patient. No  was used.     Review of patient's allergies indicates:   Allergen Reactions    Ibuprofen Hives     History reviewed. No pertinent past medical history.  History reviewed. No pertinent surgical history.  No family history on file.  Social History[1]  Review of Systems   Constitutional:  Negative for fever.   HENT:  Negative for sore throat.    Eyes:  Negative for visual disturbance.   Respiratory:  Negative for shortness of breath.    Cardiovascular:  Negative for chest pain.   Gastrointestinal:  Negative for abdominal pain, diarrhea and vomiting.   Genitourinary:  Negative for dysuria.   Musculoskeletal:  Positive for arthralgias (Left elbow and wrist) and joint swelling.   Skin:  Negative for rash.   Neurological:  Negative for syncope, weakness and headaches.       Physical Exam     Initial Vitals [05/15/25 1649]   BP Pulse Resp Temp  SpO2   122/70 108 17 97.3 °F (36.3 °C) 100 %      MAP       --         Physical Exam    Vitals reviewed.  Constitutional: She appears well-developed and well-nourished.   HENT:   Head: Normocephalic.   No posterior oropharyngeal swelling noted.  No tongue swelling.  Patient patient maintaining oral secretions   Eyes: Conjunctivae are normal.   Neck:   Normal range of motion.  Cardiovascular:  Normal rate, regular rhythm and normal heart sounds.           Pulmonary/Chest: Breath sounds normal. No respiratory distress. She has no wheezes. She has no rhonchi. She has no rales.   Patient is speaking in full complete sentences, no acute respiratory distress.  Patient is maintaining airway   Musculoskeletal:         General: Normal range of motion.      Cervical back: Normal range of motion.      Comments: Tenderness to palpation to posterior elbow.  Mild swelling appreciated.  Patient has limited range motion of elbow due to pain.  Able to extend elbow to 110° before coming painful.  Patient also experiences pain with full flexion of elbow.  Patient endorses pain with supination and pronation of left arm.  No tenderness to palpation of left wrist or snuffbox tenderness.  Patient has good  strength with pain.  2+ radial pulses.  Finger opposition intact no obvious deformity.     Neurological: She is alert.   Skin: Skin is warm and dry. No rash noted. No erythema.   Psychiatric: She has a normal mood and affect. Her behavior is normal. Judgment and thought content normal.         ED Course   Procedures  Labs Reviewed   POCT URINE PREGNANCY       Result Value    POC Preg Test, Ur Negative       Acceptable Yes            Imaging Results              X-Ray Wrist Complete Left (Final result)  Result time 05/15/25 20:07:44      Final result by Bernice Reyes MD (05/15/25 20:07:44)                   Impression:      No acute displaced fracture seen.      Electronically signed by: Bernice Reyes  MD  Date:    05/15/2025  Time:    20:07               Narrative:    EXAMINATION:  XR WRIST COMPLETE 3 VIEWS LEFT    CLINICAL HISTORY:  Pain in left wrist    TECHNIQUE:  PA, lateral, and oblique views of the left wrist were performed.    COMPARISON:  None    FINDINGS:  No evidence of acute displaced fracture, dislocation, or osseous destructive process.  There is negative ulnar variance.  No radiopaque retained foreign body seen.                                       X-Ray Elbow Complete Left (Final result)  Result time 05/15/25 20:12:32      Final result by Bernice Reyes MD (05/15/25 20:12:32)                   Impression:      Acute nondisplaced radial head fracture with elbow joint effusion.      Electronically signed by: Bernice Reyes MD  Date:    05/15/2025  Time:    20:12               Narrative:    EXAMINATION:  XR ELBOW COMPLETE 3 VIEW LEFT    CLINICAL HISTORY:  Pain in unspecified elbow    TECHNIQUE:  AP, lateral, and oblique views of the left elbow were performed.    COMPARISON:  None    FINDINGS:  Acute nondisplaced radial head fracture is seen.  There is elbow joint effusion.  No additional acute displaced fracture or dislocation seen.                                       Medications   ibuprofen tablet 600 mg (600 mg Oral Given 5/15/25 1755)   diphenhydrAMINE capsule 25 mg (25 mg Oral Given 5/15/25 1847)   dexAMETHasone injection 8 mg (8 mg Intramuscular Given 5/15/25 1848)   famotidine tablet 20 mg (20 mg Oral Given 5/15/25 1847)     Medical Decision Making  30-year-old female presents secondary to left elbow and wrist pain status post mechanical fall.  Differential diagnosis includes but not limited to: Fracture, dislocation, sprain/strain, contusion, and others    Patient is overall well-appearing in no acute distress.  Vital signs reassuring.  Afebrile.  On physical exam patient had tenderness to posterior aspect of left elbow with mild swelling appreciated.  Patient endorses pain with  supination pronation of left arm.  Patient's range motion of elbow is limited due to pain.  No tenderness to palpation of wrist, snuffbox tenderness or fingers finger opposition intact.  2+ distal pulses.  Rest of physical as seen above.    X-ray of elbow showed acute nondisplaced radial head fracture, no fracture to the left wrist    Patient received ibuprofen for pain.  Approximately an hour after receiving medication was informed that patient was breaking out in hives all over her face.  Patient does endorse previous reaction approximately a month ago.  Patient is maintaining airway, no tongue swelling or throat tightness.  Patient in no acute respiratory distress.  No signs of anaphylaxis patient received Pepcid, Benadryl and Decadron.  Added allergy of ibuprofen to patient's chart.  Reassessment approximately 40 minutes after medication administration hives were decreasing and patient states she was feeling better.      Place patient in a sling and told complete immobilization for the next 2 days.  After 2 days to take sling off and to start with gentle range motion and stretches such as supination and pronation and then progress to flexion and extension.  Instructed to continue to ice area and take Tylenol as needed.  Instructed to follow up with Orthopedics and PCP.  We discussed strict return precautions.  Patient is understanding and agreeable with treatment plan    Amount and/or Complexity of Data Reviewed  Radiology: ordered. Decision-making details documented in ED Course.    Risk  OTC drugs.  Prescription drug management.            Scribe Attestation:   Scribe #1: I performed the above scribed service and the documentation accurately describes the services I performed. I attest to the accuracy of the note.        ED Course as of 05/15/25 2036   Thu May 15, 2025   1715 BP: 122/70 [MB]   1715 Temp: 97.3 °F (36.3 °C) [MB]   1715 Pulse: 108 [MB]   1715 Resp: 17 [MB]   1715 SpO2: 100 % [MB]   1846 Visitor  with patient's stepped out and informed me that patient has started having hives break out all over her face.  Patient reports to me that she had a similar reaction approximately a month ago when taking ibuprofen.  She reports she took a Benadryl and applied a steroid cream and it resolved in approximately an hour.  She denies any tongue swelling, throat tightening, difficulty swallowing, difficulty breathing.  On physical exam posterior oropharyngeal is clear with no signs of swelling.  Patient maintaining oral secretions.  Lung sounds clear throughout.  Will add ibuprofen to patient's allergy list.  Will give patient Benadryl, Pepcid and Decadron injection for reaction [MB]   1931 Went to reassess patient proximally 40 minutes after medication administration.  Hives seem to be decreasing in the face.  Patient is still feeling okay in no acute respiratory distress.  Denies any anaphylaxis symptoms. [MB]   2016 X-Ray Elbow Complete Left  Acute nondisplaced radial head fracture with elbow joint effusion. [MB]      ED Course User Index  [MB] Felecia Castellon PA-C          I, Felecia Castellon PA-C, personally performed the services described in this documentation. All medical record entries made by the scribe were at my direction and in my presence. I have reviewed the chart and agree that the record reflects my personal performance and is accurate and complete.                  Clinical Impression:  Final diagnoses:  [M25.529] Elbow pain  [M25.532] Left wrist pain  [W19.XXXA] Fall, initial encounter  [S52.125A] Closed nondisplaced fracture of head of left radius, initial encounter (Primary)          ED Disposition Condition    Discharge Stable          ED Prescriptions    None       Follow-up Information       Follow up With Specialties Details Why Contact Info    [facility] Orthopedics Schedule an appointment as soon as possible for a visit in 3 days for follow up [address] [facility] [id number] [phone]    [facility] - Emergency Dept Emergency Medicine Go to  If symptoms worsen, As needed, shortness of breath, chest pain, fever, worsening cough, nausea, vomiting, abdominal pain [address] [facility] [place] [id number] [phone]                 [1]   Social History  Tobacco Use    Smoking status: Never   Substance Use Topics    Alcohol use: No    Drug use: No        Felecia Castellon PA-C  05/15/25 2031

## 2025-05-16 NOTE — ED NOTES
Patient reports breaking out with hives and redness on the face after taking ibuprofen, noted by JOSE Castellon with medications ordered. Patient denies any shortness of breath.

## 2025-05-16 NOTE — DISCHARGE INSTRUCTIONS
Problem Specific Instructions:  Wear the sling for the next 2 days.  After that you need to take the sling off and do gentle range motion stretches.  Continue to ice the area.  Take Tylenol as needed for pain.  Follow up with Orthopedics as needed.  If x-ray shows anything more concerning than what we spoke about I will call you and informe you.    Any bone/joint/muscle injury, regardless of broken bones or not may take between 4-6 weeks to heal.  You may ice it throughout the day, compress it with something like an Ace wrap or compression sleeve and elevate it above your heart regularly to prevent or reduce swelling. If you are not improving in that time, follow-up with your primary care provider or orthopedics for re-evaluation. Return to the Emergency Department if you experience worsening pain, numbness, tingling, change of color in the body part, or any other concerning symptoms.     If you would like to follow up with the [facility] for further care of your fracture, please call the [facility] Scheduling Department at [phone] during business hours. Please let the  know you need a fracture follow-up appointment with Orthopedics, and you will be scheduled in the Orthopedic Clinic. Please bring your original Emergency Department discharge papers and disc with you to the clinic appointment.     If you received or are discharged with pain medicine or muscle relaxers, understand that they can make you sleepy or impair your judgement. Do not make important decisions, drink, drive, swim or perform any other tasks you would not otherwise perform while impaired for at least 24 hours after your last dose.      Ensure you follow up with your Primary Care Provider or any additional providers listed on this discharge sheet. While you may be healthy enough to go home today, I cannot predict the exact course of your diagnoses. It is important to remember that some problems are difficult  to diagnose and may not be found during your first visit. As such, it is your responsibility to monitor symptoms, follow-up with another healthcare provider, or return to the emergency room for new or worsening concerns. Unless otherwise instructed, continue all home medications and any new medications prescribed to you in the Emergency Department.

## 2025-05-16 NOTE — ED TRIAGE NOTES
Patient reports pain and swelling on the left elbow after falling while roller skating. Patient states she was falling backward and tried to catch herself with both hand. She also reports abrasion on her right palm. Denies any loss of consciousness, vomiting or nausea.

## 2025-05-19 ENCOUNTER — TELEPHONE (OUTPATIENT)
Dept: ORTHOPEDICS | Facility: CLINIC | Age: 30
End: 2025-05-19
Payer: COMMERCIAL

## 2025-05-19 DIAGNOSIS — S52.122A LEFT RADIAL HEAD FRACTURE: Primary | ICD-10-CM

## 2025-05-19 NOTE — TELEPHONE ENCOUNTER
LVM with patient. Asked for a call back to move up patient's appointment to this week due to fracture.     Mari Franco MS, ATC, OTC  Clinical/Surgical Assistant - Dr. Sondra Alcala  Orthopedics  Phone: [phone]

## 2025-05-19 NOTE — PROGRESS NOTES
Assessment: 30 y.o. female with L radial head fracture    I explained my diagnostic impression and the reasoning behind it in detail, using layman's terms.       Plan:   - Instructed in ROM - if no improving in next 2 weeks will send to PT   - Return to clinic in 4 weeks. Return sooner if symptoms worsen or fail to improve.    Work Status: Light Duty   left upper extremity:   weight bearing/lifting restricted to 10 lbs     Start date of restrictions: 5/22/25   Associated diagnosis: L radial head fracture   Anticipated duration of restrictions:  6 weeks    All questions were answered in detail. The patient is in full agreement with the treatment plan and will proceed accordingly.    Chief Complaint   Patient presents with    Left Elbow - Injury, Pain, Swelling       Initial visit (5/22/25): Azucena Bowles is a 30 y.o. female who presents today complaining of Injury, Pain, and Swelling of the Left Elbow     Duration of symptoms:  1 week  Trauma or new activity: yes - fell while roller skating   Pain is constant  Stiffness particularly with extension  Some popping with rotation of forearm  Allergic to NSAIDs   Pain is not bad enough to warrant taking tylenol       This patient was seen in consultation at the request of Felecia Castellon    This is the extent of the patient's complaints at this time.       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 Mood and affect are appropriate.  Patient does not appear to be in any distress, denies any constitutional symptoms and appears stated age.   HEENT:  Pupils are equal and round, sclera are not injected. External examination of ears and nose reveals no abnormalities. Cranial nerves II-X are grossly intact  Skin:   no rashes, abrasions or open wounds on the affected extremity   Resp:  No respiratory distress or audible wheezing   CV: 2+  pulses, all extremities warm and well perfused   Left Upper extremity   AROM extension 20, flexion 150   Sup 90   Pron 90   Ecchymosis to medial elbow   No NV changes     Imaging: 3 views of the left elbow show non displaced radial head fx     I personally reviewed and interpreted the patient's imaging obtained today in clinic       A note notifying Felecia Castellon of my findings was sent via the electronic medical record     This note was created by combination of typed  and M-Modal dictation. Transcription and phonetic errors may be present.  If there are any questions, please contact me.    No past medical history on file.    There are no active problems to display for this patient.      No past surgical history on file.    No family history on file.             [1]   Current Outpatient Medications:     ergocalciferol (VITAMIN D2) 50,000 unit Cap, Take 50,000 Units by mouth every 7 days., Disp: , Rfl:     levothyroxine (SYNTHROID) 50 MCG tablet, TK 1 T PO MONDAY THRU FRIDAY. SKIP SATURDAYS AND SUNDAYS, Disp: , Rfl: 1    polymyxin B sulf-trimethoprim (POLYTRIM) 10,000 unit- 1 mg/mL Drop, Place 1 drop into the right eye every 6 (six) hours., Disp: 10 mL, Rfl: 0

## 2025-05-22 ENCOUNTER — OFFICE VISIT (OUTPATIENT)
Dept: ORTHOPEDICS | Facility: CLINIC | Age: 30
End: 2025-05-22
Payer: COMMERCIAL

## 2025-05-22 ENCOUNTER — APPOINTMENT (OUTPATIENT)
Dept: RADIOLOGY | Facility: HOSPITAL | Age: 30
End: 2025-05-22
Attending: ORTHOPAEDIC SURGERY
Payer: COMMERCIAL

## 2025-05-22 ENCOUNTER — PATIENT MESSAGE (OUTPATIENT)
Dept: ORTHOPEDICS | Facility: CLINIC | Age: 30
End: 2025-05-22

## 2025-05-22 DIAGNOSIS — M25.529 ELBOW PAIN: ICD-10-CM

## 2025-05-22 DIAGNOSIS — M25.532 LEFT WRIST PAIN: ICD-10-CM

## 2025-05-22 DIAGNOSIS — S52.122A LEFT RADIAL HEAD FRACTURE: ICD-10-CM

## 2025-05-22 DIAGNOSIS — S52.125A CLOSED NONDISPLACED FRACTURE OF HEAD OF LEFT RADIUS, INITIAL ENCOUNTER: ICD-10-CM

## 2025-05-22 PROCEDURE — 1160F RVW MEDS BY RX/DR IN RCRD: CPT | Mod: CPTII,S$GLB,, | Performed by: ORTHOPAEDIC SURGERY

## 2025-05-22 PROCEDURE — 99204 OFFICE O/P NEW MOD 45 MIN: CPT | Mod: S$GLB,,, | Performed by: ORTHOPAEDIC SURGERY

## 2025-05-22 PROCEDURE — 1159F MED LIST DOCD IN RCRD: CPT | Mod: CPTII,S$GLB,, | Performed by: ORTHOPAEDIC SURGERY

## 2025-05-22 PROCEDURE — 99999 PR PBB SHADOW E&M-EST. PATIENT-LVL III: CPT | Mod: PBBFAC,,, | Performed by: ORTHOPAEDIC SURGERY

## 2025-05-22 PROCEDURE — 73080 X-RAY EXAM OF ELBOW: CPT | Mod: TC,FY,PN,LT

## 2025-05-22 PROCEDURE — 73080 X-RAY EXAM OF ELBOW: CPT | Mod: 26,LT,, | Performed by: RADIOLOGY

## 2025-05-22 NOTE — LETTER
May 22, 2025    Azucena Bowles  [address]  [place] [address]         [facility]  [address]  [address]  [place] [address]  Phone: [phone] May 22, 2025     Patient: Azucena Bowles   YOB: 1995   Date of Visit: 5/22/2025       To Whom It May Concern:    It is my medical opinion that Azucena Bowles may return to light duty immediately with the following restrictions: no lifting more than 10 lbs. Please excuse from work on 5/22/25 and 5/23/25.    If you have any questions or concerns, please don't hesitate to call.    Sincerely,        Sondra Alcala MD

## 2025-06-05 DIAGNOSIS — S52.125A CLOSED NONDISPLACED FRACTURE OF HEAD OF LEFT RADIUS, INITIAL ENCOUNTER: Primary | ICD-10-CM

## 2025-06-18 NOTE — PROGRESS NOTES
Assessment: 30 y.o. female with L radial head fracture    I explained my diagnostic impression and the reasoning behind it in detail, using layman's terms.       Plan:   - OK to return to full activity, full duty at work  - Motion looks good, complete PT  - RTC PRN     All questions were answered in detail. The patient is in full agreement with the treatment plan and will proceed accordingly.    Chief Complaint   Patient presents with    Left Elbow - Injury, Pain     Initial visit (5/22/25): Azucena Bowles is a 30 y.o. female who presents today complaining of Injury and Pain of the Left Elbow     Duration of symptoms:  1 week  Trauma or new activity: yes - fell while roller skating   Pain is constant  Stiffness particularly with extension  Some popping with rotation of forearm  Allergic to NSAIDs   Pain is not bad enough to warrant taking tylenol     6/23/25  Patient returns for 4 week follow up of L radial head fracture.   She started physical therapy with improvement in ROM        This is the extent of the patient's complaints at this time.       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 Mood and affect are appropriate.  Patient does not appear to be in any distress, denies any constitutional symptoms and appears stated age.   HEENT:  Pupils are equal and round, sclera are not injected. External examination of ears and nose reveals no abnormalities. Cranial nerves II-X are grossly intact  Skin:  no rashes, abrasions or open wounds on the affected extremity   Resp:  No respiratory distress or audible wheezing   CV: 2+  pulses, all extremities warm and well perfused   Left Upper extremity   AROM extension 05, flexion 160   Sup 90   Pron 90    Ecchymosis to medial elbow   No NV changes     Imaging: no new        This note was created by combination of typed  and M-Modal dictation. Transcription and phonetic errors may be present.  If there are any questions, please contact me.    No past medical history on file.    There are no active problems to display for this patient.      No past surgical history on file.    No family history on file.               [1]   Current Outpatient Medications:     ergocalciferol (VITAMIN D2) 50,000 unit Cap, Take 50,000 Units by mouth every 7 days., Disp: , Rfl:     levothyroxine (SYNTHROID) 50 MCG tablet, TK 1 T PO MONDAY THRU FRIDAY. SKIP SATURDAYS AND SUNDAYS, Disp: , Rfl: 1    polymyxin B sulf-trimethoprim (POLYTRIM) 10,000 unit- 1 mg/mL Drop, Place 1 drop into the right eye every 6 (six) hours., Disp: 10 mL, Rfl: 0

## 2025-06-23 ENCOUNTER — OFFICE VISIT (OUTPATIENT)
Dept: ORTHOPEDICS | Facility: CLINIC | Age: 30
End: 2025-06-23
Payer: COMMERCIAL

## 2025-06-23 DIAGNOSIS — S52.125A CLOSED NONDISPLACED FRACTURE OF HEAD OF LEFT RADIUS, INITIAL ENCOUNTER: Primary | ICD-10-CM

## 2025-06-23 PROCEDURE — 1160F RVW MEDS BY RX/DR IN RCRD: CPT | Mod: CPTII,S$GLB,, | Performed by: ORTHOPAEDIC SURGERY

## 2025-06-23 PROCEDURE — 99212 OFFICE O/P EST SF 10 MIN: CPT | Mod: S$GLB,,, | Performed by: ORTHOPAEDIC SURGERY

## 2025-06-23 PROCEDURE — 1159F MED LIST DOCD IN RCRD: CPT | Mod: CPTII,S$GLB,, | Performed by: ORTHOPAEDIC SURGERY

## 2025-06-23 PROCEDURE — 99999 PR PBB SHADOW E&M-EST. PATIENT-LVL II: CPT | Mod: PBBFAC,,, | Performed by: ORTHOPAEDIC SURGERY
